# Patient Record
Sex: FEMALE | Race: WHITE | NOT HISPANIC OR LATINO | Employment: FULL TIME | URBAN - METROPOLITAN AREA
[De-identification: names, ages, dates, MRNs, and addresses within clinical notes are randomized per-mention and may not be internally consistent; named-entity substitution may affect disease eponyms.]

---

## 2024-05-28 PROBLEM — F39 UNSPECIFIED MOOD (AFFECTIVE) DISORDER: Status: ACTIVE | Noted: 2024-05-28

## 2024-05-29 PROBLEM — F33.3 SEVERE EPISODE OF RECURRENT MAJOR DEPRESSIVE DISORDER, WITH PSYCHOTIC FEATURES: Status: ACTIVE | Noted: 2024-05-29

## 2024-10-14 ENCOUNTER — TELEPHONE (OUTPATIENT)
Dept: SPORTS MEDICINE | Facility: CLINIC | Age: 24
End: 2024-10-14

## 2024-10-14 NOTE — TELEPHONE ENCOUNTER
Spoke to patient. Patient informed that Dr. Loomis does not treat hips.     Patient stated understanding.     ----- Message from Marie sent at 10/14/2024  1:04 PM CDT -----       Nature of Call: requesting to be notified of referral    Best Call Back Number: [phone]      Additional Information:  Sally Araujo calling regarding Patient Advice for wanting t check to see if the referral that was sent over on Tuesday and Friday of last week for the PT to be scheduled for right hip pain w/ possible posterior labral tear.  PT is looking to be scheduled with Vladislav only from the [email or web address] that is sent , please call to inform if referral was received and the PT can be scheduled

## 2025-01-25 ENCOUNTER — TELEPHONE (OUTPATIENT)
Dept: ORTHOPEDICS | Facility: CLINIC | Age: 25
End: 2025-01-25